# Patient Record
Sex: MALE | Race: BLACK OR AFRICAN AMERICAN | NOT HISPANIC OR LATINO | Employment: UNEMPLOYED | URBAN - NONMETROPOLITAN AREA
[De-identification: names, ages, dates, MRNs, and addresses within clinical notes are randomized per-mention and may not be internally consistent; named-entity substitution may affect disease eponyms.]

---

## 2021-04-28 ENCOUNTER — APPOINTMENT (OUTPATIENT)
Dept: GENERAL RADIOLOGY | Facility: HOSPITAL | Age: 31
End: 2021-04-28

## 2021-04-28 ENCOUNTER — HOSPITAL ENCOUNTER (EMERGENCY)
Facility: HOSPITAL | Age: 31
Discharge: HOME | End: 2021-04-28
Admitting: EMERGENCY MEDICINE

## 2021-04-28 VITALS
TEMPERATURE: 99.2 F | SYSTOLIC BLOOD PRESSURE: 133 MMHG | RESPIRATION RATE: 18 BRPM | DIASTOLIC BLOOD PRESSURE: 84 MMHG | WEIGHT: 202 LBS | HEART RATE: 97 BPM | OXYGEN SATURATION: 99 % | BODY MASS INDEX: 28.28 KG/M2 | HEIGHT: 71 IN

## 2021-04-28 DIAGNOSIS — U07.1 COVID-19: Primary | ICD-10-CM

## 2021-04-28 LAB — SARS-COV-2 RDRP RESP QL NAA+PROBE: DETECTED

## 2021-04-28 PROCEDURE — 87635 SARS-COV-2 COVID-19 AMP PRB: CPT | Performed by: NURSE PRACTITIONER

## 2021-04-28 PROCEDURE — 71045 X-RAY EXAM CHEST 1 VIEW: CPT

## 2021-04-28 PROCEDURE — 99283 EMERGENCY DEPT VISIT LOW MDM: CPT

## 2021-04-29 ENCOUNTER — EPISODE CHANGES (OUTPATIENT)
Dept: CASE MANAGEMENT | Facility: OTHER | Age: 31
End: 2021-04-29

## 2021-05-11 ENCOUNTER — PATIENT OUTREACH (OUTPATIENT)
Dept: CASE MANAGEMENT | Facility: OTHER | Age: 31
End: 2021-05-11

## 2021-05-11 NOTE — OUTREACH NOTE
"ED Potential Covid Discharge Follow-up    Patient seen at [facility] ED 4-29-21 for exposure to COVID-19;tested positive for COVID-19. Spoke with patient briefly and he reports \"I'm doing great\". He denied symptoms and is released from quarantine. Patient does not take prescription medications.     Casi Tomlinson RN  Ambulatory     5/11/2021, 10:06 CDT      "

## 2022-05-21 ENCOUNTER — HOSPITAL ENCOUNTER (EMERGENCY)
Facility: HOSPITAL | Age: 32
Discharge: HOME | End: 2022-05-21
Attending: EMERGENCY MEDICINE | Admitting: EMERGENCY MEDICINE

## 2022-05-21 ENCOUNTER — APPOINTMENT (OUTPATIENT)
Dept: GENERAL RADIOLOGY | Facility: HOSPITAL | Age: 32
End: 2022-05-21

## 2022-05-21 VITALS
SYSTOLIC BLOOD PRESSURE: 156 MMHG | TEMPERATURE: 98.8 F | RESPIRATION RATE: 20 BRPM | DIASTOLIC BLOOD PRESSURE: 82 MMHG | BODY MASS INDEX: 27.3 KG/M2 | HEART RATE: 84 BPM | WEIGHT: 195 LBS | OXYGEN SATURATION: 98 % | HEIGHT: 71 IN

## 2022-05-21 DIAGNOSIS — R09.81 NASAL CONGESTION: ICD-10-CM

## 2022-05-21 DIAGNOSIS — R05.9 COUGH: Primary | ICD-10-CM

## 2022-05-21 DIAGNOSIS — R50.9 FEVER, UNSPECIFIED: ICD-10-CM

## 2022-05-21 LAB
FLUAV RNA RESP QL NAA+PROBE: NOT DETECTED
FLUBV RNA RESP QL NAA+PROBE: NOT DETECTED
SARS-COV-2 RNA RESP QL NAA+PROBE: NOT DETECTED

## 2022-05-21 PROCEDURE — 99283 EMERGENCY DEPT VISIT LOW MDM: CPT

## 2022-05-21 PROCEDURE — 87636 SARSCOV2 & INF A&B AMP PRB: CPT | Performed by: EMERGENCY MEDICINE

## 2022-05-21 PROCEDURE — C9803 HOPD COVID-19 SPEC COLLECT: HCPCS | Performed by: EMERGENCY MEDICINE

## 2022-05-21 PROCEDURE — 71045 X-RAY EXAM CHEST 1 VIEW: CPT

## 2022-05-21 RX ORDER — BENZONATATE 100 MG/1
100 CAPSULE ORAL 3 TIMES DAILY PRN
Qty: 15 CAPSULE | Refills: 0 | Status: SHIPPED | OUTPATIENT
Start: 2022-05-21 | End: 2022-05-26

## 2022-05-21 RX ORDER — FLUTICASONE PROPIONATE 50 MCG
2 SPRAY, SUSPENSION (ML) NASAL DAILY
Qty: 11.1 ML | Refills: 0 | Status: SHIPPED | OUTPATIENT
Start: 2022-05-21 | End: 2022-05-26

## 2022-05-22 NOTE — ED PROVIDER NOTES
Emergency Medicine Provider Note    Subjective:    HISTORY OF PRESENT ILLNESS     This is a very pleasant 32 y.o. male with no significant past medical history who presents to the emergency department today with a chief complaint of fever, cough, nasal congestion.  This all began on Wednesday.  Intermittent.  Moderate.  No exacerbating or relieving factors.  Was exposed to pneumonia at work.  Denies any pain anywhere.  No abdominal pain, nausea, vomiting, headache, chest pain, shortness of breath.          History is obtained from the patient.       Review of Systems: All other systems are reviewed and are negative other than noted in the HPI.    Past Medical History:  History reviewed. No pertinent past medical history.    Allergies:  No Known Allergies    Past Surgical History:  History reviewed. No pertinent surgical history.    Family History:  History reviewed. No pertinent family history.    Social History:  Social History     Socioeconomic History   • Marital status: Single   Tobacco Use   • Smoking status: Current Every Day Smoker     Packs/day: 0.25     Types: Cigarettes   Substance and Sexual Activity   • Alcohol use: Never   • Drug use: Never       Home Medications:  Prior to Admission medications    Not on File         Objective:    PHYSICAL EXAM     Vitals:   Vitals:    05/21/22 2006   BP: 156/82   Pulse: 84   Resp: 20   Temp: 98.8 °F (37.1 °C)   SpO2: 98%     GENERAL: Well appearing, in no acute distress.   HEENT: Moist mucous membranes, oropharynx clear without lesions, exudates, thrush.   EYES: No scleral icterus, conjunctivae clear.   NECK: No cervical lymphadenopathy, no stiffness.  CARDIAC: Normal rate, regular rhythm, no murmurs, 2+ peripheral pulses in all four extremities, normal capillary refill.   PULMONARY: Normal work of breathing on room air, lungs are clear to auscultation bilaterally without wheezes, crackles, rhonchi.  ABDOMINAL: Normal bowel sounds, abdomen is soft, non-tender,  non-distended, no hepatomegaly or splenomegaly.   MUSCULOSKELETAL: Normal range of motion, no lower extremity edema.  NEUROLOGIC: Alert and oriented x 3, EOM grossly intact and moves all four extremities with normal strength.  SKIN: Warm and dry without rashes.   PSYCHIATRIC: Mood and affect are normal.     PROCEDURES     Procedures    LAB AND RADIOLOGY RESULTS     Lab Results (last 24 hours)     ** No results found for the last 24 hours. **          XR Chest 1 View    Result Date: 5/21/2022  Narrative: EXAMINATION: Chest 1 view 5/21/2022  Comparison: 4/28/2021  HISTORY:  Shortness of air with cough and fever  One-view chest: Upright frontal projection of the chest is obtained. The lungs are clear with no evidence of acute parenchymal  consolidation. No pleural effusion or free air is observed. The  mediastinal contours are within normal limits.                                                                                                                     Impression: Impression: No evidence of acute cardiopulmonary disease. This report was finalized on 05/21/2022 21:08 by Dr. Mayank Leblanc MD.      ED course:    Medications - No data to display       Amount and/or complexity of data reviewed:    • Clinical lab tests ordered and reviewed.  • Tests in the radiology section ordered and reviewed.  • Independent visualization of imaging, tracing, or specimen is remarkable for chest x-ray with no infiltrates, no pneumothorax.        Risk of significant complications, morbidity, and/or mortality.    •  Presenting problem: moderate  •  Diagnostic procedures: moderate  •  Management options: moderate        MEDICAL DECISION MAKING     Patient presents with cough, nasal congestion, fever. Upon arrival to the Emergency Department the patient is in no acute distress, his vital signs are reassuring.  He is tested for COVID and flu.  He is evaluated with a chest x-ray.Per my interpretation chest x-ray is reassuring.   Patient has no chest pain to suggest myocarditis, acute coronary syndrome.  No pneumothorax on physical exam or radiograph.  No pneumonia on physical exam or radiograph.  He is PERC negative.  I feel he is likely suffering from a viral illness.  We will treat symptomatically.  I discussed with the patient.  He has verbalized understanding.  He is discharged in good condition and he is given commonsense return precautions which he verbalizes understanding of.        Diagnosis:    Final diagnoses:   Cough   Fever, unspecified   Nasal congestion         ED Disposition:     ED Disposition     ED Disposition   Discharge    Condition   Stable    Comment   --             Provider, No Known  [facility] [id number]  [phone]    Schedule an appointment as soon as possible for a visit in 2 days           Medication List      New Prescriptions    benzonatate 100 MG capsule  Commonly known as: TESSALON  Take 1 capsule by mouth 3 (Three) Times a Day As Needed for Cough for up to 5 days.     fluticasone 50 MCG/ACT nasal spray  Commonly known as: FLONASE  2 sprays into the nostril(s) as directed by provider Daily for 5 days.           Where to Get Your Medications      You can get these medications from any pharmacy    Bring a paper prescription for each of these medications  · benzonatate 100 MG capsule  · fluticasone 50 MCG/ACT nasal spray              George Kimble MD  05/23/22 0807